# Patient Record
Sex: MALE | Race: WHITE
[De-identification: names, ages, dates, MRNs, and addresses within clinical notes are randomized per-mention and may not be internally consistent; named-entity substitution may affect disease eponyms.]

---

## 2019-02-27 NOTE — RAD
LUMBAR SPINE:

2/27/19

 

Three views.

 

INDICATIONS:

Fall with injury to back and back pain.

 

Comparison made to lumbar films of 2/4/19.

 

The anterior wedge compression deformity at L1 is again noted. This was described on the prior exam. 
There is anterior height loss of probably 25%. This is stable from prior exam. Posterior height is pr
eserved and posterior alignment is maintained. 

 

The other lumbar vertebrae maintain height. There are moderate degenerative changes which are again s
een, stable from prior exam. 

 

IMPRESSION:  

Compression deformity at L1 is again noted, stable from recent exam. Lumbar spine is unchanged from t
he recent study of 2/4/19.

 

POS: [facility]

## 2019-04-02 ENCOUNTER — HOSPITAL ENCOUNTER (OUTPATIENT)
Dept: HOSPITAL 92 - TBSIIMAG | Age: 69
Discharge: HOME | End: 2019-04-02
Attending: NEUROLOGICAL SURGERY
Payer: MEDICARE

## 2019-04-02 DIAGNOSIS — S32.010D: Primary | ICD-10-CM

## 2019-04-02 DIAGNOSIS — M47.816: ICD-10-CM

## 2019-04-02 PROCEDURE — 88305 TISSUE EXAM BY PATHOLOGIST: CPT

## 2019-04-02 PROCEDURE — 72100 X-RAY EXAM L-S SPINE 2/3 VWS: CPT

## 2019-04-02 NOTE — RAD
F2 views of the lumbar spine:

4/20/2019



COMPARISON: 2/27/2019



HISTORY: Reevaluate L1 compression fracture



FINDINGS: There is an anterior wedge compression fracture of the L1 vertebral body demonstrating appr
oximately 30% loss of vertebral body height anteriorly, not significantly changed when compared to th
e prior exam. No new fracture identified. Facet hypertrophy noted within the lower lumbar spine, stab
le. Stable multilevel disc space narrowing with anterior osteophyte formation noted, most significant
 at L3-4.



IMPRESSION: Stable degenerative changes of the lumbar spine. Stable anterior wedge compression fractu
re L1.



Reported By: Christopher Rodriguez 

Electronically Signed:  4/2/2019 2:30 PM

## 2022-12-13 ENCOUNTER — HOSPITAL ENCOUNTER (EMERGENCY)
Dept: HOSPITAL 92 - CSHERS | Age: 72
Discharge: HOME | End: 2022-12-13
Payer: MEDICARE

## 2022-12-13 DIAGNOSIS — I48.91: ICD-10-CM

## 2022-12-13 DIAGNOSIS — E78.5: ICD-10-CM

## 2022-12-13 DIAGNOSIS — S01.01XA: Primary | ICD-10-CM

## 2022-12-13 DIAGNOSIS — E11.9: ICD-10-CM
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
## 2022-12-13 DIAGNOSIS — Z79.01: ICD-10-CM

## 2022-12-13 DIAGNOSIS — I10: ICD-10-CM

## 2022-12-13 PROCEDURE — 72125 CT NECK SPINE W/O DYE: CPT

## 2022-12-13 PROCEDURE — 12002 RPR S/N/AX/GEN/TRNK2.6-7.5CM: CPT

## 2022-12-13 PROCEDURE — 70450 CT HEAD/BRAIN W/O DYE: CPT

## 2022-12-23 ENCOUNTER — HOSPITAL ENCOUNTER (EMERGENCY)
Dept: HOSPITAL 92 - CSHERS | Age: 72
Discharge: HOME | End: 2022-12-23
Payer: COMMERCIAL

## 2022-12-23 DIAGNOSIS — E78.5: ICD-10-CM

## 2022-12-23 DIAGNOSIS — F17.220: ICD-10-CM

## 2022-12-23 DIAGNOSIS — E11.9: ICD-10-CM
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
## 2022-12-23 DIAGNOSIS — I10: ICD-10-CM

## 2022-12-23 DIAGNOSIS — S01.01XD: Primary | ICD-10-CM
